# Patient Record
Sex: MALE | Race: WHITE | NOT HISPANIC OR LATINO | Employment: OTHER | URBAN - METROPOLITAN AREA
[De-identification: names, ages, dates, MRNs, and addresses within clinical notes are randomized per-mention and may not be internally consistent; named-entity substitution may affect disease eponyms.]

---

## 2022-05-21 ENCOUNTER — APPOINTMENT (OUTPATIENT)
Dept: RADIOLOGY | Facility: MEDICAL CENTER | Age: 62
End: 2022-05-21
Payer: COMMERCIAL

## 2022-05-21 ENCOUNTER — HOSPITAL ENCOUNTER (EMERGENCY)
Facility: MEDICAL CENTER | Age: 62
End: 2022-05-21
Attending: EMERGENCY MEDICINE
Payer: COMMERCIAL

## 2022-05-21 ENCOUNTER — APPOINTMENT (OUTPATIENT)
Dept: RADIOLOGY | Facility: MEDICAL CENTER | Age: 62
End: 2022-05-21
Attending: EMERGENCY MEDICINE
Payer: COMMERCIAL

## 2022-05-21 VITALS
WEIGHT: 187.39 LBS | DIASTOLIC BLOOD PRESSURE: 78 MMHG | TEMPERATURE: 97.1 F | BODY MASS INDEX: 26.23 KG/M2 | HEART RATE: 50 BPM | HEIGHT: 71 IN | OXYGEN SATURATION: 98 % | RESPIRATION RATE: 12 BRPM | SYSTOLIC BLOOD PRESSURE: 133 MMHG

## 2022-05-21 DIAGNOSIS — R07.89 CHEST PRESSURE: ICD-10-CM

## 2022-05-21 LAB
ALBUMIN SERPL BCP-MCNC: 4.1 G/DL (ref 3.2–4.9)
ALBUMIN/GLOB SERPL: 1.6 G/DL
ALP SERPL-CCNC: 132 U/L (ref 30–99)
ALT SERPL-CCNC: 30 U/L (ref 2–50)
ANION GAP SERPL CALC-SCNC: 10 MMOL/L (ref 7–16)
AST SERPL-CCNC: 22 U/L (ref 12–45)
BASOPHILS # BLD AUTO: 0.6 % (ref 0–1.8)
BASOPHILS # BLD: 0.05 K/UL (ref 0–0.12)
BILIRUB SERPL-MCNC: 0.8 MG/DL (ref 0.1–1.5)
BUN SERPL-MCNC: 13 MG/DL (ref 8–22)
CALCIUM SERPL-MCNC: 8.4 MG/DL (ref 8.4–10.2)
CHLORIDE SERPL-SCNC: 101 MMOL/L (ref 96–112)
CO2 SERPL-SCNC: 24 MMOL/L (ref 20–33)
CREAT SERPL-MCNC: 1.26 MG/DL (ref 0.5–1.4)
EKG IMPRESSION: NORMAL
EKG IMPRESSION: NORMAL
EOSINOPHIL # BLD AUTO: 0.5 K/UL (ref 0–0.51)
EOSINOPHIL NFR BLD: 6 % (ref 0–6.9)
ERYTHROCYTE [DISTWIDTH] IN BLOOD BY AUTOMATED COUNT: 39.6 FL (ref 35.9–50)
GFR SERPLBLD CREATININE-BSD FMLA CKD-EPI: 64 ML/MIN/1.73 M 2
GLOBULIN SER CALC-MCNC: 2.6 G/DL (ref 1.9–3.5)
GLUCOSE SERPL-MCNC: 162 MG/DL (ref 65–99)
HCT VFR BLD AUTO: 45.9 % (ref 42–52)
HGB BLD-MCNC: 15.4 G/DL (ref 14–18)
IMM GRANULOCYTES # BLD AUTO: 0.02 K/UL (ref 0–0.11)
IMM GRANULOCYTES NFR BLD AUTO: 0.2 % (ref 0–0.9)
LYMPHOCYTES # BLD AUTO: 1.89 K/UL (ref 1–4.8)
LYMPHOCYTES NFR BLD: 22.6 % (ref 22–41)
MCH RBC QN AUTO: 28.3 PG (ref 27–33)
MCHC RBC AUTO-ENTMCNC: 33.6 G/DL (ref 33.7–35.3)
MCV RBC AUTO: 84.2 FL (ref 81.4–97.8)
MONOCYTES # BLD AUTO: 0.74 K/UL (ref 0–0.85)
MONOCYTES NFR BLD AUTO: 8.9 % (ref 0–13.4)
NEUTROPHILS # BLD AUTO: 5.16 K/UL (ref 1.82–7.42)
NEUTROPHILS NFR BLD: 61.7 % (ref 44–72)
NRBC # BLD AUTO: 0 K/UL
NRBC BLD-RTO: 0 /100 WBC
PLATELET # BLD AUTO: 231 K/UL (ref 164–446)
PMV BLD AUTO: 11.5 FL (ref 9–12.9)
POTASSIUM SERPL-SCNC: 3.9 MMOL/L (ref 3.6–5.5)
PROT SERPL-MCNC: 6.7 G/DL (ref 6–8.2)
RBC # BLD AUTO: 5.45 M/UL (ref 4.7–6.1)
SODIUM SERPL-SCNC: 135 MMOL/L (ref 135–145)
TROPONIN T SERPL-MCNC: <6 NG/L (ref 6–19)
TROPONIN T SERPL-MCNC: <6 NG/L (ref 6–19)
WBC # BLD AUTO: 8.4 K/UL (ref 4.8–10.8)

## 2022-05-21 PROCEDURE — 93005 ELECTROCARDIOGRAM TRACING: CPT

## 2022-05-21 PROCEDURE — 700102 HCHG RX REV CODE 250 W/ 637 OVERRIDE(OP): Performed by: EMERGENCY MEDICINE

## 2022-05-21 PROCEDURE — 99284 EMERGENCY DEPT VISIT MOD MDM: CPT

## 2022-05-21 PROCEDURE — 80053 COMPREHEN METABOLIC PANEL: CPT

## 2022-05-21 PROCEDURE — 84484 ASSAY OF TROPONIN QUANT: CPT

## 2022-05-21 PROCEDURE — 36415 COLL VENOUS BLD VENIPUNCTURE: CPT

## 2022-05-21 PROCEDURE — 85025 COMPLETE CBC W/AUTO DIFF WBC: CPT

## 2022-05-21 PROCEDURE — 93005 ELECTROCARDIOGRAM TRACING: CPT | Performed by: EMERGENCY MEDICINE

## 2022-05-21 PROCEDURE — A9270 NON-COVERED ITEM OR SERVICE: HCPCS | Performed by: EMERGENCY MEDICINE

## 2022-05-21 PROCEDURE — 71045 X-RAY EXAM CHEST 1 VIEW: CPT

## 2022-05-21 RX ORDER — ASPIRIN 81 MG/1
324 TABLET, CHEWABLE ORAL ONCE
Status: COMPLETED | OUTPATIENT
Start: 2022-05-21 | End: 2022-05-21

## 2022-05-21 RX ORDER — ASPIRIN 81 MG/1
162 TABLET, CHEWABLE ORAL ONCE
Status: DISCONTINUED | OUTPATIENT
Start: 2022-05-21 | End: 2022-05-21

## 2022-05-21 RX ADMIN — LIDOCAINE HYDROCHLORIDE 30 ML: 20 SOLUTION OROPHARYNGEAL at 19:48

## 2022-05-21 RX ADMIN — ASPIRIN 81 MG CHEWABLE TABLET 324 MG: 81 TABLET CHEWABLE at 18:05

## 2022-05-21 ASSESSMENT — PAIN DESCRIPTION - DESCRIPTORS: DESCRIPTORS: ACHING;PRESSURE

## 2022-05-22 NOTE — ED TRIAGE NOTES
"Pt reports a hx of \" heart stent\" placement in 2009.  He presents today complaining of acute onset of central chest pressure recurring for the past 48 hours. He is in the process of moving to [place], and currently living with her daughter.   Chief Complaint   Patient presents with   • Chest Pressure     /71   Pulse (!) 58   Temp 36.7 °C (98 °F) (Temporal)   Resp 18   Ht 1.803 m (5' 11\")   Wt 85 kg (187 lb 6.3 oz)   SpO2 99%   BMI 26.14 kg/m²    Has this patient been vaccinated for COVID YES  If not, would they like to be vaccinated while in the ER if eligible?  N/A  Would the patient like to speak with the ERP about the possibility of receiving the COVID vaccine today before making a decision? N/A     "

## 2022-05-22 NOTE — ED PROVIDER NOTES
ED Provider Note    CHIEF COMPLAINT  Chief Complaint   Patient presents with   • Chest Pressure       HPI  Olesya Moss is a 61 y.o. male who presents to the emergency department chief complaint of central chest pressure.  The patient states that this morning the symptoms began and became constant throughout the day he states at times they have been associated with nausea but no vomiting.  He states it kind of spreads throughout the chest does not radiate to the back jaw or arms.  He states he has not felt short of breath with this.  They are currently moving and so is been lifting a lot of boxes throughout the day but as the symptoms did not improve he decided to come in to the emergency department.  He does have a history of coronary artery disease and had a stent in his LAD which was placed in 2009.  He states at that time his heart attack felt like severe chest pressure which radiated to his neck bilateral arms back and chest and this does not feel that intense.  He has been compliant with all of his medications he denies any chest pain or dyspnea on exertion.  He today denies any unilateral bilateral swelling  Patient took his at home metoprolol as well as 162 mg of aspirin    REVIEW OF SYSTEMS  Positives as above. Pertinent negatives include nausea vomiting fevers chills cough congestion dyspnea on exertion chest pain exertion unilateral bilateral leg swelling headache neck stiffness abdominal pain flank pain back pain jaw pain arm pain  All other review of systems are negative    PAST MEDICAL HISTORY   has a past medical history of CAD (coronary artery disease).    SOCIAL HISTORY  Social History     Tobacco Use   • Smoking status: Never Smoker   • Smokeless tobacco: Never Used   Vaping Use   • Vaping Use: Never used   Substance and Sexual Activity   • Alcohol use: Yes     Comment: Rarely   • Drug use: Never   • Sexual activity: Not on file       SURGICAL HISTORY  patient denies any surgical  "history    CURRENT MEDICATIONS  Home Medications    **Home medications have not yet been reviewed for this encounter**         ALLERGIES  No Known Allergies    PHYSICAL EXAM  VITAL SIGNS: /71   Pulse (!) 58   Temp 36.7 °C (98 °F) (Temporal)   Resp 18   Ht 1.803 m (5' 11\")   Wt 85 kg (187 lb 6.3 oz)   SpO2 99%   BMI 26.14 kg/m²    Pulse ox interpretation: I interpret this pulse ox as normal.  Constitutional: Alert in no apparent distress.  HENT: Normocephalic atraumatic, MMM  Eyes: PER, Conjunctiva normal, Non-icteric.   Neck: Normal range of motion, No tenderness, Supple, No stridor.   Cardiovascular: Regular rate and rhythm, no murmurs.   Thorax & Lungs: Normal breath sounds, No respiratory distress, No wheezing, No chest tenderness.   Abdomen: Bowel sounds normal, Soft, No tenderness, No pulsatile masses. No peritoneal signs.  Skin: Warm, Dry, No erythema, No rash.   Back: No bony tenderness, No CVA tenderness.   Extremities/MSK: Intact equal distal pulses, No edema, No tenderness, No cyanosis, no major deformities noted  Neurologic: Alert and oriented x3, No focal deficits noted.       DIFFERENTIAL DIAGNOSIS AND WORK UP PLAN    This is a 61 y.o. male who presents with central chest pressure which he states is actually better than it was this morning and was associate with nausea earlier.  He states his pain is only merely about a 1 out of 10 and just mildly uncomfortable.  Does not radiate I have low concern pulmonary embolism the patient's well score is 0 this could be unstable angina especially with this function and uncomfortableness with exertion he states normally with exertion he feels fine.  His EKG shows sinus bradycardia will repeat just to ensure there is no acute changes as well as cardiac enzyme chest x-ray has I doubt that pneumothorax or pneumonia.    DIAGNOSTIC STUDIES / PROCEDURES    EKG  Results for orders placed or performed during the hospital encounter of 05/21/22   EKG   Result " Value Ref Range    Report       [facility] Emergency Dept.    Test Date:  2022  Pt Name:    [facility]              Department: [facility]  MRN:        [id number]                      Room:  Gender:     Male                         Technician: ROMARIO  :        1960                   Requested By:ER TRIAGE PROTOCOL  Order #:    [id number]                    Reading MD: Tracy Alejandre MD    Measurements  Intervals                                Axis  Rate:       52                           P:          75  FL:         204                          QRS:        39  QRSD:       98                           T:          62  QT:         463  QTc:        431    Interpretive Statements  Sinus bradycardia at a rate of 52 no ST elevations or ST depressions no  abnormal  T wave inversions no pathognomonic Q waves normal intervals normal axis  No previous ECG available for comparison  Electronically Signed On 2022 18:57:40 PDT by Tracy Alejandre MD     EKG (NOW)   Result Value Ref Range    Report       [facility] Emergency Dept.    Test Date:  2022  Pt Name:    [facility]              Department: [facility]  MRN:        [id number]                      Room:       [facility]  Gender:     Male                         Technician: ROMARIO  :        1960                   Requested By:TRACY ALEJANDRE  Order #:    [id number]                    Reading MD: Tracy Alejandre MD    Measurements  Intervals                                Axis  Rate:       46                           P:          63  FL:         208                          QRS:        32  QRSD:       90                           T:          57  QT:         480  QTc:        420    Interpretive Statements  Sinus bradycardia rate of 46 no ST elevations or ST depressions no abnormal T  wave inversions no pathognomonic Q waves normal intervals normal axis  Compared to ECG 2022 17:23:27  Unchanged from  "prior  Electronically Signed On 5- 18:57:54 PDT by Tracy Heath MD         LABS  Pertinent Lab Findings    Labs Reviewed   CBC WITH DIFFERENTIAL - Abnormal; Notable for the following components:       Result Value    MCHC 33.6 (*)     All other components within normal limits   COMP METABOLIC PANEL - Abnormal; Notable for the following components:    Glucose 162 (*)     Alkaline Phosphatase 132 (*)     All other components within normal limits   TROPONIN   ESTIMATED GFR   TROPONIN       RADIOLOGY  DX-CHEST-PORTABLE (1 VIEW)   Final Result      1.  There is no acute cardiopulmonary process.   2.  Hazy nodular density left lower lobe could represent a nipple shadow. Lung nodule is not completely excluded. There is also a questionable right mid hemithorax nodule. These could be further assessed with noncontrast chest CT.        The radiologist's interpretation of all radiological studies have been reviewed by me.      COURSE & MEDICAL DECISION MAKING  Pertinent Labs & Imaging studies reviewed. (See chart for details)    6:15 PM  I reassessed patient the bedside is resting comfortably I did give another 162 mg of aspirin he states he still feels about the same EKG is unchanged first cardiac enzyme is within normal limits we discussed the plan for repeat troponin at 2 hours.  He understands feels comfortable with plan    8:29 PM  Reassessed patient at bedside is resting comfortably he feels improved after GI cocktail.  He states his chest pain is now resolved.  Negative troponin x2 unchanged EKG x2.  He is new to the area and I have referred him to primary care and also given strict return precautions.  The patient's heart score is 4 but he feels comfortable going home and states this symptoms are resolved.    /78   Pulse (!) 50   Temp 36.2 °C (97.1 °F) (Temporal)   Resp 12   Ht 1.803 m (5' 11\")   Wt 85 kg (187 lb 6.3 oz)   SpO2 98%   BMI 26.14 kg/m²       I verified that the patient was wearing a " mask and I was wearing appropriate PPE every time I entered the room. The patient's mask was on the patient at all times during my encounter except for a brief view of the oropharynx.    The patient will return for new or worsening symptoms and is stable at the time of discharge.    The patient is referred to a primary physician for blood pressure management, diabetic screening, and for all other preventative health concerns.    DISPOSITION:  Patient will be discharged home in stable condition.    FOLLOW UP:  [facility], Emergency Dept  [address]  [place] [id number] [phone]    If symptoms worsen      OUTPATIENT MEDICATIONS:  There are no discharge medications for this patient.            FINAL IMPRESSION  1. Chest pressure  Referral to establish with [facility] PCP           Electronically signed by: Tracy Heath M.D., 5/21/2022 5:54 PM    This dictation has been created using voice recognition software and/or scribes. The accuracy of the dictation is limited by the abilities of the software and the expertise of the scribes. I expect there may be some errors of grammar and possibly content. I made every attempt to manually correct the errors within my dictation. However, errors related to voice recognition software and/or scribes may still exist and should be interpreted within the appropriate context.

## 2022-05-22 NOTE — ED NOTES
Pt given discharge instructions including a referral to establish with [facility] PCP; verbalized understanding of instructions.  Ambulated out wife

## 2022-05-22 NOTE — ED NOTES
Rounded on pt waiting for trop results. Pt states he still has some discomfort in his chest but says it is more like heartburn. Dr. Heath notified- GI cocktail given.

## 2023-01-24 PROBLEM — I25.10 CORONARY ARTERY DISEASE DUE TO LIPID RICH PLAQUE: Status: ACTIVE | Noted: 2023-01-24

## 2023-01-24 PROBLEM — I25.83 CORONARY ARTERY DISEASE DUE TO LIPID RICH PLAQUE: Status: ACTIVE | Noted: 2023-01-24

## 2023-01-24 PROBLEM — I10 HYPERTENSION: Status: ACTIVE | Noted: 2023-01-24

## 2023-01-24 PROBLEM — E89.0 POSTOPERATIVE HYPOTHYROIDISM: Status: ACTIVE | Noted: 2023-01-24

## 2023-04-19 PROBLEM — Z95.5 HISTORY OF PLACEMENT OF STENT IN LAD CORONARY ARTERY: Status: ACTIVE | Noted: 2023-04-19

## 2023-04-19 PROBLEM — Z85.850 HISTORY OF THYROID CANCER: Status: ACTIVE | Noted: 2023-04-19
